# Patient Record
Sex: MALE | Race: WHITE | NOT HISPANIC OR LATINO | URBAN - METROPOLITAN AREA
[De-identification: names, ages, dates, MRNs, and addresses within clinical notes are randomized per-mention and may not be internally consistent; named-entity substitution may affect disease eponyms.]

---

## 2020-06-11 ENCOUNTER — OFFICE (OUTPATIENT)
Dept: URBAN - METROPOLITAN AREA CLINIC 79 | Facility: CLINIC | Age: 65
End: 2020-06-11
Payer: MEDICAID

## 2020-06-11 VITALS — HEIGHT: 66 IN | WEIGHT: 145 LBS

## 2020-06-11 DIAGNOSIS — R63.4 ABNORMAL WEIGHT LOSS: ICD-10-CM

## 2020-06-11 DIAGNOSIS — R10.13 EPIGASTRIC PAIN: ICD-10-CM

## 2020-06-11 DIAGNOSIS — Z86.19 PERSONAL HISTORY OF OTHER INFECTIOUS AND PARASITIC DISEASES: ICD-10-CM

## 2020-06-11 DIAGNOSIS — R14.0 ABDOMINAL DISTENSION (GASEOUS): ICD-10-CM

## 2020-06-11 PROCEDURE — 99204 OFFICE O/P NEW MOD 45 MIN: CPT | Mod: 95 | Performed by: PHYSICIAN ASSISTANT

## 2020-06-11 NOTE — SERVICEHPINOTES
PATIENT VERIFIED BY DATE OF BIRTH AND NAME. Patient has been consented for this phone visit.     STARR HURTADO   is a   64   year old male who is being seen in consultation at the request of   YESENIA VALDES   for bloating, fatigue, and positive Hep C antibody test. He does report known h/o Hep C and took Harvoni in 2015 and reports a cure. He currently complains of upper abdominal bloating and discomfort. He reports symptoms for about 3 years. Says it feels the same about every day, though occasionally he notes that the bloating improves for a few hours. Seems ok at night. He denies N/V. Denies known triggers or alleviating factors. He has tried Gas X and probiotics which have not helped with his bloating. Reports a daily BM but can have some hard stools and straining. Had a colonoscopy at [facility] about 4 years ago.  Notes h/o fungal infection in his lung prior to treatment of his Hep C. He reports being treated for his Hep C by [facility].  Denies fluid overload in legs. Reports loss of about 10 pounds of weight over the past few years - feels he has lost some muscle mass. Reports normal appetite. Notes easy bruising. Reports concern about memory loss - can lose train of thought sometimes. Denies disorientation with driving. Reports sleeping okay. Also notes anxiety. Denies ETOH use (quit alcohol 2015 - drank beer daily). Denies soda intake.2/18/20 plt 211, AST/ALT 17/10, Hep C ab pos, HgbA1C 5.4, , TSH wnl

## 2020-06-11 NOTE — SERVICENOTES
I have reviewed the history, physical exam, assessment and management plans.  I concur with or have edited all elements of her note.

Patient's visit was conducted through phone. Patient consented before the start of visit as to understanding of privacy concerns, possible technological failure, and their responsibility of carrying out instructions of plan.

## 2020-06-11 NOTE — INTERFACERESULTNOTES
Labs are ok except for low vitamin D. Otherwise, celiac test is negative, Hep C viral level is negative, liver function numbers are normal. Due to low vitamin D, please buy this OTC and take 5000 IU per day with food. Take that dose for 6 months, then reduce to 2000 IU/day thereafter.

## 2020-06-18 LAB
AMBIG ABBREV CMP14 DEFAULT: (no result)
CBC/DIFF AMBIGUOUS DEFAULT: BASO (ABSOLUTE): 0 X10E3/UL (ref 0–0.2)
CBC/DIFF AMBIGUOUS DEFAULT: BASOS: 0 %
CBC/DIFF AMBIGUOUS DEFAULT: EOS (ABSOLUTE): 0.2 X10E3/UL (ref 0–0.4)
CBC/DIFF AMBIGUOUS DEFAULT: EOS: 3 %
CBC/DIFF AMBIGUOUS DEFAULT: HEMATOCRIT: 40.3 % (ref 37.5–51)
CBC/DIFF AMBIGUOUS DEFAULT: HEMATOLOGY COMMENTS: (no result)
CBC/DIFF AMBIGUOUS DEFAULT: HEMOGLOBIN: 13.9 G/DL (ref 13–17.7)
CBC/DIFF AMBIGUOUS DEFAULT: IMMATURE CELLS: (no result)
CBC/DIFF AMBIGUOUS DEFAULT: IMMATURE GRANS (ABS): 0 X10E3/UL (ref 0–0.1)
CBC/DIFF AMBIGUOUS DEFAULT: IMMATURE GRANULOCYTES: 0 %
CBC/DIFF AMBIGUOUS DEFAULT: LYMPHS (ABSOLUTE): 1.6 X10E3/UL (ref 0.7–3.1)
CBC/DIFF AMBIGUOUS DEFAULT: LYMPHS: 26 %
CBC/DIFF AMBIGUOUS DEFAULT: MCH: 28.6 PG (ref 26.6–33)
CBC/DIFF AMBIGUOUS DEFAULT: MCHC: 34.5 G/DL (ref 31.5–35.7)
CBC/DIFF AMBIGUOUS DEFAULT: MCV: 83 FL (ref 79–97)
CBC/DIFF AMBIGUOUS DEFAULT: MONOCYTES(ABSOLUTE): 0.5 X10E3/UL (ref 0.1–0.9)
CBC/DIFF AMBIGUOUS DEFAULT: MONOCYTES: 8 %
CBC/DIFF AMBIGUOUS DEFAULT: NEUTROPHILS (ABSOLUTE): 4 X10E3/UL (ref 1.4–7)
CBC/DIFF AMBIGUOUS DEFAULT: NEUTROPHILS: 63 %
CBC/DIFF AMBIGUOUS DEFAULT: NRBC: (no result)
CBC/DIFF AMBIGUOUS DEFAULT: PLATELETS: 251 X10E3/UL (ref 150–450)
CBC/DIFF AMBIGUOUS DEFAULT: RBC: 4.86 X10E6/UL (ref 4.14–5.8)
CBC/DIFF AMBIGUOUS DEFAULT: RDW: 12.3 % (ref 11.6–15.4)
CBC/DIFF AMBIGUOUS DEFAULT: WBC: 6.3 X10E3/UL (ref 3.4–10.8)
CELIAC DISEASE COMPREHENSIVE: DEAMIDATED GLIADIN ABS, IGA: 5 UNITS (ref 0–19)
CELIAC DISEASE COMPREHENSIVE: DEAMIDATED GLIADIN ABS, IGG: 2 UNITS (ref 0–19)
CELIAC DISEASE COMPREHENSIVE: ENDOMYSIAL ANTIBODY IGA: NEGATIVE
CELIAC DISEASE COMPREHENSIVE: IMMUNOGLOBULIN A, QN, SERUM: 186 MG/DL (ref 61–437)
CELIAC DISEASE COMPREHENSIVE: T-TRANSGLUTAMINASE (TTG) IGA: <2 U/ML
CELIAC DISEASE COMPREHENSIVE: T-TRANSGLUTAMINASE (TTG) IGG: <2 U/ML
COMP. METABOLIC PANEL (14): A/G RATIO: 1.8 (ref 1.2–2.2)
COMP. METABOLIC PANEL (14): ALBUMIN: 4.4 G/DL (ref 3.8–4.8)
COMP. METABOLIC PANEL (14): ALKALINE PHOSPHATASE: 59 IU/L (ref 39–117)
COMP. METABOLIC PANEL (14): ALT (SGPT): 11 IU/L (ref 0–44)
COMP. METABOLIC PANEL (14): AST (SGOT): 18 IU/L (ref 0–40)
COMP. METABOLIC PANEL (14): BILIRUBIN, TOTAL: 0.5 MG/DL (ref 0–1.2)
COMP. METABOLIC PANEL (14): BUN/CREATININE RATIO: 21 (ref 10–24)
COMP. METABOLIC PANEL (14): BUN: 15 MG/DL (ref 8–27)
COMP. METABOLIC PANEL (14): CALCIUM: 8.8 MG/DL (ref 8.6–10.2)
COMP. METABOLIC PANEL (14): CARBON DIOXIDE, TOTAL: 25 MMOL/L (ref 20–29)
COMP. METABOLIC PANEL (14): CHLORIDE: 103 MMOL/L (ref 96–106)
COMP. METABOLIC PANEL (14): CREATININE: 0.7 MG/DL — LOW (ref 0.76–1.27)
COMP. METABOLIC PANEL (14): EGFR IF AFRICN AM: 116 ML/MIN/1.73 (ref 59–?)
COMP. METABOLIC PANEL (14): EGFR IF NONAFRICN AM: 100 ML/MIN/1.73 (ref 59–?)
COMP. METABOLIC PANEL (14): GLOBULIN, TOTAL: 2.4 G/DL (ref 1.5–4.5)
COMP. METABOLIC PANEL (14): GLUCOSE: 104 MG/DL — HIGH (ref 65–99)
COMP. METABOLIC PANEL (14): POTASSIUM: 4.3 MMOL/L (ref 3.5–5.2)
COMP. METABOLIC PANEL (14): PROTEIN, TOTAL: 6.8 G/DL (ref 6–8.5)
COMP. METABOLIC PANEL (14): SODIUM: 145 MMOL/L — HIGH (ref 134–144)
HCV RNA BY PCR, QN RFX GENO: HCV GENOTYPE: (no result)
HCV RNA BY PCR, QN RFX GENO: HCV LOG10: (no result) LOG10 IU/ML
HCV RNA BY PCR, QN RFX GENO: HEPATITIS C QUANTITATION: (no result) IU/ML
HCV RNA BY PCR, QN RFX GENO: TEST INFORMATION: (no result)
PROTHROMBIN TIME (PT): INR: 1 (ref 0.8–1.2)
PROTHROMBIN TIME (PT): PROTHROMBIN TIME: 10.6 SEC (ref 9.1–12)
VITAMIN D, 25-HYDROXY: 24.8 NG/ML — LOW (ref 30–100)

## 2021-06-04 ENCOUNTER — OFFICE (OUTPATIENT)
Dept: URBAN - METROPOLITAN AREA CLINIC 79 | Facility: CLINIC | Age: 66
End: 2021-06-04
Payer: MEDICAID

## 2021-06-04 VITALS
SYSTOLIC BLOOD PRESSURE: 103 MMHG | HEIGHT: 66 IN | HEART RATE: 73 BPM | TEMPERATURE: 96.6 F | WEIGHT: 146 LBS | DIASTOLIC BLOOD PRESSURE: 66 MMHG

## 2021-06-04 DIAGNOSIS — F41.9 ANXIETY DISORDER, UNSPECIFIED: ICD-10-CM

## 2021-06-04 DIAGNOSIS — R14.0 ABDOMINAL DISTENSION (GASEOUS): ICD-10-CM

## 2021-06-04 DIAGNOSIS — Z86.19 PERSONAL HISTORY OF OTHER INFECTIOUS AND PARASITIC DISEASES: ICD-10-CM

## 2021-06-04 PROCEDURE — 99214 OFFICE O/P EST MOD 30 MIN: CPT | Performed by: PHYSICIAN ASSISTANT

## 2021-06-04 NOTE — SERVICEHPINOTES
64 yo male presents with abdominal bloating. His friend Molly is with him. Bloating is a chronic issue for years, seems to happen daily and started around the time he was treated for hepatitis C (2015 - cured with Ismael). Can have times where belly "looks pregnant" though he is not sure of triggers. His friend reports that he has a lot of anxiety and PTSD. Used to drink 3 beers a day but stopped a number of years ago. He has tried OTC meds, antacids, Gas-X without much relief. He has a BM 2x/day, reports normal stools, no blood. Last colonoscopy per records was in 2012 and was normal other than hemorrhoids. He has had unremarkable abdominal imaging and labs, including negative celiac panel. Liver biopsy in the past showed minimal hepatic fibrosis. He denies any abdominal pain or other concerns.    Notes h/o fungal infection in his lung prior to treatment of his Hep C. He has episodes of some disorientation when he has anxiety attacks. 6/18/20 celiac panel neg, plt 251, creat 0.7, alb 4.4, bili 0.5, AST/ALT 18/11, HCV RNA not detected, INR 1.0, Vit D 24.8BR2/18/20 plt 211, AST/ALT 17/10, Hep C ab pos, HgbA1C 5.4, , TSH wnl

## 2021-07-22 ENCOUNTER — TELEHEALTH PROVIDED OTHER THAN IN PATIENT'S HOME (OUTPATIENT)
Dept: URBAN - METROPOLITAN AREA TELEHEALTH 7 | Facility: TELEHEALTH | Age: 66
End: 2021-07-22
Payer: MEDICAID

## 2021-07-22 VITALS — HEIGHT: 66 IN | WEIGHT: 145 LBS

## 2021-07-22 DIAGNOSIS — R14.0 ABDOMINAL DISTENSION (GASEOUS): ICD-10-CM

## 2021-07-22 DIAGNOSIS — F41.9 ANXIETY DISORDER, UNSPECIFIED: ICD-10-CM

## 2021-07-22 DIAGNOSIS — Z86.19 PERSONAL HISTORY OF OTHER INFECTIOUS AND PARASITIC DISEASES: ICD-10-CM

## 2021-07-22 PROCEDURE — 99213 OFFICE O/P EST LOW 20 MIN: CPT | Mod: 95 | Performed by: PHYSICIAN ASSISTANT

## 2021-07-22 NOTE — SERVICEHPINOTES
PATIENT VERIFIED BY DATE OF BIRTH AND NAME. Patient has been consented for this telecommunication visit. 66 yo male presents for f/u abdominal bloating. His friend Molly is with him. He was seen in June for this issue. We discussed SIBO breath testing but he hasn't gotten this done. His memory can be poor. He reports doing ok overall, though still can have bloating. Diet doesn't seem to make much difference. No new concerns today. His weight is stable. Additional hx: Bloating is a chronic issue for years, seems to happen daily and started around the time he was treated for hepatitis C (2015 - cured with Ismael). Can have times where belly "looks pregnant" though he is not sure of triggers. His friend reports that he has a lot of anxiety and PTSD. Used to drink 3 beers a day but stopped a number of years ago. He has tried OTC meds, antacids, Gas-X without much relief. He has a BM 2x/day, reports normal stools, no blood. Last colonoscopy per records was in 2012 and was normal other than hemorrhoids. He has had unremarkable abdominal imaging and labs, including negative celiac panel. Liver biopsy in the past showed minimal hepatic fibrosis. He denies any abdominal pain or other concerns. Notes h/o fungal infection in his lung prior to treatment of his Hep C. He has episodes of some disorientation when he has anxiety attacks. ROS as above, otherwise negative.6/18/20 celiac panel neg, plt 251, creat 0.7, alb 4.4, bili 0.5, AST/ALT 18/11, HCV RNA not detected, INR 1.0, Vit D 24.8BR2/18/20 plt 211, AST/ALT 17/10, Hep C ab pos, HgbA1C 5.4, , TSH wnl

## 2022-02-24 ENCOUNTER — TELEHEALTH PROVIDED OTHER THAN IN PATIENT'S HOME (OUTPATIENT)
Dept: URBAN - METROPOLITAN AREA TELEHEALTH 7 | Facility: TELEHEALTH | Age: 67
End: 2022-02-24
Payer: COMMERCIAL

## 2022-02-24 VITALS — WEIGHT: 145 LBS | HEIGHT: 66 IN

## 2022-02-24 DIAGNOSIS — R14.0 ABDOMINAL DISTENSION (GASEOUS): ICD-10-CM

## 2022-02-24 DIAGNOSIS — B96.81 HELICOBACTER PYLORI [H. PYLORI] AS THE CAUSE OF DISEASES CLA: ICD-10-CM

## 2022-02-24 PROCEDURE — 99214 OFFICE O/P EST MOD 30 MIN: CPT | Mod: 95 | Performed by: PHYSICIAN ASSISTANT

## 2022-02-24 NOTE — SERVICEHPINOTES
PATIENT VERIFIED BY DATE OF BIRTH AND NAME. Patient has been consented for this telecommunication visit.
eloina
br66 yo male with h/o abdominal bloating presents with his friend Molly to discuss H pylori. Patient did a stool test (GI MAP) with another provider (a provider who is seeing him for Lyme disease) and stool test was positive for H pylori. He was already prescribed meds for this (Molly mentions Flagyl, bismuth, amoxicillin, omeprazole) but hasn't taken these yet as he apparently had a negative H pylori in the past and they were concerned about all the meds prescribed. He was also on doxycycline and was given Alinia for something else. He continues to have his chronic abdominal bloating. He has found some relief with charcoal tabs. 
eloina duvall Patient was last seen last summer for his bloating issues. We discussed SIBO breath testing but he hasn't gotten this done. His memory can be poor. He reports doing ok overall. Diet doesn't seem to make much difference. No other concerns today. His weight is stable and bowel habits regular.Additional hx: Bloating is a chronic issue for years, seems to happen daily and started around the time he was treated for hepatitis C (2015 - cured with Ismael). Can have times where belly "looks pregnant" though he is not sure of triggers. His friend reports that he has a lot of anxiety and PTSD. Used to drink 3 beers a day but stopped a number of years ago. He has tried OTC meds, antacids, Gas-X without much relief. He has a BM 2x/day, reports normal stools, no blood. Last colonoscopy per records was in 2012 and was normal other than hemorrhoids. He has had unremarkable abdominal imaging and labs, including negative celiac panel. Liver biopsy in the past showed minimal hepatic fibrosis. He denies any abdominal pain or other concerns.Notes h/o fungal infection in his lung prior to treatment of his Hep C.He has episodes of some disorientation when he has anxiety attacks.ROS as above, otherwise negative.6/18/20 celiac panel neg, plt 251, creat 0.7, alb 4.4, bili 0.5, AST/ALT 18/11, HCV RNA not detected, INR 1.0, Vit D 24.8br2/18/20 plt 211, AST/ALT 17/10, Hep C ab pos, HgbA1C 5.4, , TSH wnl

## 2022-03-21 LAB
H PYLORI BREATH TEST: NEGATIVE
H. PYLORI BREATH COLLECTION: (no result)

## 2022-04-22 ENCOUNTER — OFFICE (OUTPATIENT)
Dept: URBAN - METROPOLITAN AREA TELEHEALTH 12 | Facility: TELEHEALTH | Age: 67
End: 2022-04-22
Payer: COMMERCIAL

## 2022-04-22 VITALS — WEIGHT: 147 LBS | HEIGHT: 66 IN

## 2022-04-22 DIAGNOSIS — Z86.19 PERSONAL HISTORY OF OTHER INFECTIOUS AND PARASITIC DISEASES: ICD-10-CM

## 2022-04-22 DIAGNOSIS — Z12.11 ENCOUNTER FOR SCREENING FOR MALIGNANT NEOPLASM OF COLON: ICD-10-CM

## 2022-04-22 DIAGNOSIS — R14.0 ABDOMINAL DISTENSION (GASEOUS): ICD-10-CM

## 2022-04-22 PROCEDURE — 99214 OFFICE O/P EST MOD 30 MIN: CPT | Mod: 95 | Performed by: INTERNAL MEDICINE

## 2022-04-22 NOTE — SERVICEHPINOTES
PATIENT VERIFIED BY DATE OF BIRTH AND NAME. Patient has been consented for this telecommunication visit.
br
br 
66yoM with hx of anxiety, memory impairment (unclear cause based on hx or chart review), PTSD, prior hx of alcohol use, Hep C s/p treatment with Harvoni SVR who has been by GI providers for several years for issue of bloating. He was previously followed in our clinic by Luz Maria Benson. Prior recommendations include EGD (didn't want it since he wanted upper GI series first and unclear if that's done), low FODMAPs diet (not done), SIBO testing (not done).
br
brPrior testing include colonoscopy 2012 normal (due for repeat). RUQ US 6/2020 normal- no cirrhosis or liver lesions.
br Labs - celiac testing -ve. H.pylori breath test -ve.br

br Current hx br Presenting with same issue of bloating.brworse with eating gluten
br GF diet helped some
br he doesn't consume diary, carbonated beverages
br crave sugars which also makes his sx worse
br
br He has BM every other day, no hard stools. No blood in the stool.
br No family hx of CRC or polypsbr
br Diet: oatmeal, eggs, waffles, vegetables, PB &amp J sandwiches.
br
brAll 10 point review of systems have been reviewed as per HPI and otherwise negative. br
br
br